# Patient Record
(demographics unavailable — no encounter records)

---

## 2024-10-22 NOTE — ASSESSMENT
[Verbal] : Verbal [Demo] : Demo [Patient] : Patient [Good - alert, interested, motivated] : Good - alert, interested, motivated [Verbalizes knowledge/Understanding] : Verbalizes knowledge/understanding [Dressing changes] : dressing changes [Skin Care] : skin care [Signs and symptoms of infection] : sign and symptoms of infection [Nutrition] : nutrition [How and When to Call] : how and when to call [Patient responsibility to plan of care] : patient responsibility to plan of care [Stable] : stable [Home] : Home [Ambulatory] : Ambulatory [Not Applicable - Long Term Care/Home Health Agency] : Long Term Care/Home Health Agency: Not Applicable [] : No [FreeTextEntry2] : Infection Prevention Wound care and Dressing changes Promote and Restore optimal skin integrity Nutrition and Wound Healing  Offloading and Pressure relief Protect and Guard wound site  Maintain acceptable levels of Pain  [FreeTextEntry3] : Tunneling noted 2-3 o'clock  [FreeTextEntry4] : MD Adair assessed wound site - cauterized wound with Silver Nitrate stick x1. Patient verbalized understanding of all discussed. Patient to return to [facility] in Two weeks. Pt. has supplies.  Small amount of Silver alginate provided.

## 2024-10-22 NOTE — HISTORY OF PRESENT ILLNESS
[FreeTextEntry1] : 59 yo BM, here for assessment of a left groin postsurgical wound. Pt underwent right inguinal herniorhaphy in 1999. But the wound opened up several times requiring removal of the mesh/sutures with the last time 3 yrs ago. Recently, a wound opened up again about 4 mos ago.       Recently a suture remnant removed within the wound and culture showed Staph aur. Pt was started on po doxycycline. Pt states no more drainage past few days. Gentle probing only showed a depth of 2mm and no obvious signs of any sinus tract which on last visit was 2cm.  No SOI. 10/22/24 left groin wound with hypertrophic granulation. Cauterized with AgNO3 1 stick and tract depth > 2cm. No Purulent drainage. No SOI

## 2024-10-22 NOTE — PHYSICAL EXAM
[2 x 2] : 2 x 2  [JVD] : no jugular venous distention  [Normal Thyroid] : the thyroid was normal [Normal Breath Sounds] : Normal breath sounds [Normal Heart Sounds] : normal heart sounds [Normal Rate and Rhythm] : normal rate and rhythm [Abdomen Masses] : No abdominal massess [Abdomen Tenderness] : ~T ~M No abdominal tenderness [Tender] : nontender [Enlarged] : not enlarged [Alert] : alert [Oriented to Person] : oriented to person [Oriented to Place] : oriented to place [Oriented to Time] : oriented to time [Calm] : calm [de-identified] : adult BM, NAD, alert, Ox3. [de-identified] : Silver Nitrate used to cauterize wound and wound base by MD Adair 10/22/24. [FreeTextEntry1] : Groin  [FreeTextEntry2] : 0.2 [FreeTextEntry3] : 0.2 [FreeTextEntry4] : 0.5 [de-identified] : serous [de-identified] : 2.5cm between 2-3 o'clock [de-identified] : Alginate Ag [de-identified] : Mechanically cleansed with 0.9% Normal Saline Cloth tape   [TWNoteComboBox1] : Left [TWNoteComboBox4] : Small [TWNoteComboBox5] : Yes [TWNoteComboBox6] : Surgical [de-identified] : No [de-identified] : Normal [de-identified] : None [de-identified] : None [de-identified] : 100% [de-identified] : No [de-identified] : 3x Weekly [de-identified] : Primary Dressing

## 2024-12-09 NOTE — HISTORY OF PRESENT ILLNESS
[FreeTextEntry1] : 59 yo BM, here for assessment of a left groin postsurgical wound. Pt underwent right inguinal herniorhaphy in 1999. But the wound opened up several times requiring removal of the mesh/sutures with the last time 3 yrs ago. Recently, a wound opened up again about 4 mos ago.       Recently a suture remnant removed within the wound and culture showed Staph aur. Pt was started on po doxycycline. Pt states no more drainage past few days. Gentle probing only showed a depth of 2mm and no obvious signs of any sinus tract which on last visit was 2cm.  No SOI. 10/22/24 left groin wound with hypertrophic granulation. Cauterized with AgNO3 1 stick and tract depth > 2cm. No Purulent drainage. No SOI 12/9/24 left groin wound still open with hypertrophic granulation and purulent appearing drainage. Wound does not look infected. Stressed need to see operating surgeon as something may still be present in wound causing it not to close. Wound cultured and doxycycline started.

## 2024-12-09 NOTE — PHYSICAL EXAM
[2 x 2] : 2 x 2  [Normal Thyroid] : the thyroid was normal [Normal Breath Sounds] : Normal breath sounds [Normal Heart Sounds] : normal heart sounds [Normal Rate and Rhythm] : normal rate and rhythm [Alert] : alert [Oriented to Person] : oriented to person [Oriented to Place] : oriented to place [Oriented to Time] : oriented to time [Calm] : calm [JVD] : no jugular venous distention  [Abdomen Masses] : No abdominal massess [Abdomen Tenderness] : ~T ~M No abdominal tenderness [Tender] : nontender [Enlarged] : not enlarged [de-identified] : adult BM, NAD, alert, Ox3. [de-identified] : Silver Nitrate used to cauterize hyper-granulated tissue and wound base by Dr Adair [FreeTextEntry1] : Groin  [FreeTextEntry2] : 0.2 [FreeTextEntry3] : 0.2 [FreeTextEntry4] : 2.4 [de-identified] : Serosanguinous  [de-identified] : Alginate Ag [de-identified] : Mechanically cleansed with 0.9% Normal Saline Cloth tape   [TWNoteComboBox1] : Left [TWNoteComboBox4] : Moderate [TWNoteComboBox5] : No [TWNoteComboBox6] : Surgical [de-identified] : No [de-identified] : Normal [de-identified] : None [de-identified] : None [de-identified] : 100% [de-identified] : No [de-identified] : Cultures obtained [de-identified] : 3x Weekly [de-identified] : Primary Dressing

## 2024-12-09 NOTE — PLAN
[FreeTextEntry1] : ag alginate, DD qod AgNO3 used.1 stick cultured wound doxycycline started f/u 2 wks  time spent 30 mins

## 2024-12-09 NOTE — ASSESSMENT
[Verbal] : Verbal [Demo] : Demo [Patient] : Patient [Good - alert, interested, motivated] : Good - alert, interested, motivated [Verbalizes knowledge/Understanding] : Verbalizes knowledge/understanding [Dressing changes] : dressing changes [Skin Care] : skin care [Signs and symptoms of infection] : sign and symptoms of infection [Nutrition] : nutrition [How and When to Call] : how and when to call [Patient responsibility to plan of care] : patient responsibility to plan of care [Stable] : stable [Home] : Home [Ambulatory] : Ambulatory [Not Applicable - Long Term Care/Home Health Agency] : Long Term Care/Home Health Agency: Not Applicable [] : Yes [FreeTextEntry2] : Infection Prevention Wound care and Dressing changes Promote and Restore optimal skin integrity Nutrition and Wound Healing  Offloading and Pressure relief Protect and Guard wound site  Maintain acceptable levels of Pain  [FreeTextEntry4] : Culture obtained and sent to lab, Doxycycline E-Scribed to pharmacy Pt advised to contact his surgeon for a follow up. Pt is able to perform his own dressing changes, small amount of supplies provided Patient to return to [facility] in Two weeks.